# Patient Record
Sex: FEMALE | Race: WHITE
[De-identification: names, ages, dates, MRNs, and addresses within clinical notes are randomized per-mention and may not be internally consistent; named-entity substitution may affect disease eponyms.]

---

## 2019-07-30 ENCOUNTER — HOSPITAL ENCOUNTER (EMERGENCY)
Dept: HOSPITAL 47 - EC | Age: 29
Discharge: HOME | End: 2019-07-30
Payer: COMMERCIAL

## 2019-07-30 VITALS
SYSTOLIC BLOOD PRESSURE: 117 MMHG | RESPIRATION RATE: 18 BRPM | TEMPERATURE: 98.6 F | HEART RATE: 80 BPM | DIASTOLIC BLOOD PRESSURE: 67 MMHG
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
## 2019-07-30 DIAGNOSIS — F17.200: ICD-10-CM
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
## 2019-07-30 DIAGNOSIS — S92.354A: Primary | ICD-10-CM

## 2019-07-30 PROCEDURE — 29515 APPLICATION SHORT LEG SPLINT: CPT

## 2019-07-30 PROCEDURE — 99283 EMERGENCY DEPT VISIT LOW MDM: CPT

## 2019-07-30 NOTE — ED
General Adult HPI





- General


Chief complaint: Extremity Injury, Lower


Stated complaint: Foot Injury


Time Seen by Provider: 07/30/19 11:45


Source: patient


Mode of arrival: ambulatory


Limitations: no limitations





- History of Present Illness


Initial comments: 





Dictation was produced using dragon dictation software. please excuse any 

grammatical, word or spelling errors. 





Chief Complaint: 28-year-old female presents with right foot pain.





History of Present Illness: 28-year-old female she presents today with right 

foot pain.  Patient was stepping off a ladder on Sunday.  She took a step down 

with her right foot and immediately felt pain.  Patient decided come to the 

emergency department today because she was scared by a friend's and coming in 

saying that she would walk abnormally for the rest of her life.  Patient states 

the pain is located to the right lateral forefoot.  States that is painful when 

she walks.  Denies any midfoot tenderness.  No ankle pain.  Denies any numbing 

stable and paresthesias.








The ROS documented in this emergency department record has been reviewed and 

confirmed by me.  Those systems with pertinent positive or negative responses 

have been documented in the HPI.  All other systems are other negative and/or 

noncontributory.








PHYSICAL EXAM:


General Impression: Alert and oriented x3, not in acute distress


HEENT: Normocephalic atraumatic, extra-ocular movements intact, pupils equal and

reactive to light bilaterally, mucous membranes moist.


Cardiovascular: Heart regular rate and rhythm, S1&S2 audible, no murmurs, rubs 

or gallops


Chest: Lungs clear to auscultation bilaterally, no rhonchi, no wheeze, no rales


Abdomen: Bowel sounds present, abdomen soft, non-tender, non-distended, no 

organomegaly


Musculoskeletal: Pulses present and equal in all extremities, no peripheral 

edema


Motor:  no focal deficits noted


Neurological: CN II-XII grossly intact, no focal motor or sensory deficits noted


Skin: Intact with no visualized rashes


Psych: Normal affect and mood


Right foot: Tenderness to palpation over the fourth and fifth metatarsal heads. 

No right foot tenderness.





ED course: 28-year-old female presents with right foot pain.  On arrival are 

within acceptable limits.X-ray shows acute nondisplaced minimally comminuted 

fracture of the diaphysis of the fifth metatarsal.  Patient placed in a 

posterior mold splint.  Still to be nonweightbearing to the right lower 

extremity.  Patient given referral to orthopedic surgery for outpatient 

management of symptoms.














- Related Data


                                Home Medications











 Medication  Instructions  Recorded  Confirmed


 


Acetaminophen [Tylenol] 650 mg PO TID PRN 07/30/19 07/30/19











                                    Allergies











Allergy/AdvReac Type Severity Reaction Status Date / Time


 


No Known Allergies Allergy   Verified 07/30/19 12:04














Review of Systems


ROS Statement: 


Those systems with pertinent positive or pertinent negative responses have been 

documented in the HPI.





ROS Other: All systems not noted in ROS Statement are negative.





Past Medical History


Past Medical History: Asthma


History of Any Multi-Drug Resistant Organisms: None Reported


Past Surgical History: Adenoidectomy, Tonsillectomy


Past Anesthesia/Blood Transfusion Reactions: No Reported Reaction


Past Psychological History: Depression


Smoking Status: Current every day smoker


Past Alcohol Use History: Occasional


Past Drug Use History: None Reported





- Past Family History


  ** Father


Family Medical History: No Reported History





General Exam


Limitations: no limitations





Course


                                   Vital Signs











  07/30/19





  11:40


 


Temperature 98.6 F


 


Pulse Rate 80


 


Respiratory 18





Rate 


 


Blood Pressure 117/67


 


O2 Sat by Pulse 98





Oximetry 














Disposition


Clinical Impression: 


 Foot fracture, right





Disposition: HOME SELF-CARE


Condition: Good


Instructions (If sedation given, give patient instructions):  Foot Fracture in 

Adults (ED)


Is patient prescribed a controlled substance at d/c from ED?: No


Referrals: 


Jaquan Smith MD [Medical Doctor] - 1-2 days


Time of Disposition: 12:59

## 2019-07-30 NOTE — XR
EXAMINATION TYPE: XR foot complete RT

 

DATE OF EXAM: 7/30/2019

 

CLINICAL HISTORY: Right foot pain after falling off of a ladder

 

TECHNIQUE: Frontal, lateral, and oblique images of the right foot are obtained.

 

COMPARISON: None

 

FINDINGS: There is an obliquely oriented nondisplaced, very minimally comminuted there is a fracture 
of the distal diaphysis of the fifth metatarsal with overlying soft tissue swelling. No additional fr
acture seen of the right foot. Osseous mineralization is within normal limits. No radiopaque foreign 
body.

 

IMPRESSION: Acute nondisplaced very minimally comminuted fracture of the distal diaphysis of the fift
h metatarsal on the right foot.